# Patient Record
Sex: MALE | Race: OTHER | NOT HISPANIC OR LATINO | Employment: FULL TIME | URBAN - METROPOLITAN AREA
[De-identification: names, ages, dates, MRNs, and addresses within clinical notes are randomized per-mention and may not be internally consistent; named-entity substitution may affect disease eponyms.]

---

## 2024-11-07 ENCOUNTER — OFFICE VISIT (OUTPATIENT)
Dept: URGENT CARE | Facility: CLINIC | Age: 44
End: 2024-11-07
Payer: COMMERCIAL

## 2024-11-07 VITALS
HEART RATE: 80 BPM | HEIGHT: 74 IN | WEIGHT: 180.56 LBS | RESPIRATION RATE: 22 BRPM | TEMPERATURE: 102 F | SYSTOLIC BLOOD PRESSURE: 123 MMHG | BODY MASS INDEX: 23.17 KG/M2 | DIASTOLIC BLOOD PRESSURE: 77 MMHG | OXYGEN SATURATION: 100 %

## 2024-11-07 DIAGNOSIS — R50.9 FEVER, UNSPECIFIED FEVER CAUSE: Primary | ICD-10-CM

## 2024-11-07 DIAGNOSIS — B34.9 ACUTE VIRAL SYNDROME: ICD-10-CM

## 2024-11-07 LAB
CTP QC/QA: YES
CTP QC/QA: YES
POC MOLECULAR INFLUENZA A AGN: NEGATIVE
POC MOLECULAR INFLUENZA B AGN: NEGATIVE
SARS-COV-2 AG RESP QL IA.RAPID: NEGATIVE

## 2024-11-07 PROCEDURE — 71046 X-RAY EXAM CHEST 2 VIEWS: CPT | Mod: S$GLB,,, | Performed by: RADIOLOGY

## 2024-11-07 PROCEDURE — 99214 OFFICE O/P EST MOD 30 MIN: CPT | Mod: S$GLB,,, | Performed by: EMERGENCY MEDICINE

## 2024-11-07 PROCEDURE — 87502 INFLUENZA DNA AMP PROBE: CPT | Mod: QW,S$GLB,, | Performed by: EMERGENCY MEDICINE

## 2024-11-07 PROCEDURE — 87811 SARS-COV-2 COVID19 W/OPTIC: CPT | Mod: QW,S$GLB,, | Performed by: EMERGENCY MEDICINE

## 2024-11-07 RX ORDER — IBUPROFEN 200 MG
600 TABLET ORAL
Status: COMPLETED | OUTPATIENT
Start: 2024-11-07 | End: 2024-11-07

## 2024-11-07 RX ORDER — BROMPHENIRAMINE MALEATE, PSEUDOEPHEDRINE HYDROCHLORIDE, AND DEXTROMETHORPHAN HYDROBROMIDE 2; 30; 10 MG/5ML; MG/5ML; MG/5ML
10 SYRUP ORAL EVERY 6 HOURS PRN
Qty: 200 ML | Refills: 0 | Status: SHIPPED | OUTPATIENT
Start: 2024-11-07 | End: 2024-11-17

## 2024-11-07 RX ADMIN — Medication 600 MG: at 07:11

## 2024-11-08 NOTE — PATIENT INSTRUCTIONS
X-ray shows no pneumonia     COVID swab negative     Flu swab negative     Rest and hydrate with plenty of fluids    Ibuprofen 600 mg every 6 hours as needed for fever or body aches or headaches     Okay to add Tylenol     Bromfed DM prescription for symptomatic relief of runny nose, cough, congestion.      Return for any concerns or problems.

## 2024-11-08 NOTE — PROGRESS NOTES
"Subjective:      Patient ID: Kevin Blanco is a 44 y.o. male.    Vitals:  height is 6' 2" (1.88 m) and weight is 81.9 kg (180 lb 8.9 oz). His oral temperature is 102.2 °F (39 °C) (abnormal). His blood pressure is 123/77 and his pulse is 80. His respiration is 22 (abnormal) and oxygen saturation is 100%.     Chief Complaint: Fever    43 yo c/o fever,dry cough and headache since Monday pm 11/1. REPORTS FATIGUE, NO GI NOR URINARY COMPLAINTS.    Fever   This is a new problem. The problem occurs constantly. The problem has been gradually worsening. He has not experienced a heat injury.Associated symptoms include coughing, headaches and muscle aches. Pertinent negatives include no abdominal pain, chest pain, congestion, nausea, sore throat or vomiting. He has tried NSAIDs for the symptoms. The treatment provided no relief.     ROS  Constitution: Positive for fever. Negative for chills.   HENT:  Negative for congestion, postnasal drip, sinus pain and sore throat.    Neck: Negative for neck pain and neck stiffness.   Cardiovascular:  Negative for chest pain and palpitations.   Respiratory:  Positive for cough. Negative for shortness of breath.    Gastrointestinal:  Negative for abdominal pain, nausea and vomiting.   Genitourinary:  Negative for dysuria and hematuria.   Musculoskeletal:  Negative for joint pain.   Skin:  Negative for wound and laceration.   Neurological:  Positive for headaches. Negative for altered mental status, numbness and tingling.   Psychiatric/Behavioral:  Negative for altered mental status.       Objective:     Physical Exam   Constitutional: He is oriented to person, place, and time. He appears well-developed. He is cooperative.  Non-toxic appearance. He does not appear ill. No distress.   HENT:   Head: Normocephalic and atraumatic.   Ears:   Right Ear: Hearing, tympanic membrane, external ear and ear canal normal.   Left Ear: Hearing, tympanic membrane, external ear and ear canal normal. "   Nose: Congestion present. No mucosal edema, rhinorrhea or nasal deformity. No epistaxis. Right sinus exhibits no maxillary sinus tenderness and no frontal sinus tenderness. Left sinus exhibits no maxillary sinus tenderness and no frontal sinus tenderness.   Mouth/Throat: Uvula is midline, oropharynx is clear and moist and mucous membranes are normal. No trismus in the jaw. Normal dentition. No uvula swelling. No oropharyngeal exudate, posterior oropharyngeal edema or posterior oropharyngeal erythema.   Eyes: Conjunctivae and lids are normal. No scleral icterus.   Neck: Trachea normal and phonation normal. Neck supple. No edema present. No erythema present. No neck rigidity present.   Cardiovascular: Normal rate, regular rhythm, normal heart sounds and normal pulses.   Pulmonary/Chest: Effort normal and breath sounds normal. No respiratory distress. He has no decreased breath sounds. He has no wheezes. He has no rhonchi. He has no rales.   Abdominal: Normal appearance.   Musculoskeletal: Normal range of motion.         General: No deformity. Normal range of motion.   Neurological: He is alert and oriented to person, place, and time. He exhibits normal muscle tone. Coordination normal.   Skin: Skin is warm, dry, intact, not diaphoretic and not pale.   Psychiatric: His speech is normal and behavior is normal. Judgment and thought content normal.   Nursing note and vitals reviewed.        Results for orders placed or performed in visit on 11/07/24   SARS Coronavirus 2 Antigen, POCT Manual Read    Collection Time: 11/07/24  7:44 PM   Result Value Ref Range    SARS Coronavirus 2 Antigen Negative Negative     Acceptable Yes    POCT Influenza A/B MOLECULAR    Collection Time: 11/07/24  7:44 PM   Result Value Ref Range    POC Molecular Influenza A Ag Negative Negative    POC Molecular Influenza B Ag Negative Negative     Acceptable Yes      XR CHEST PA AND LATERAL    Result Date:  11/7/2024  EXAMINATION: CHEST PA AND LATERAL CLINICAL HISTORY: Fever, unspecified TECHNIQUE: PA and lateral chest radiograph COMPARISON: <None.> FINDINGS: The cardiac silhouette is within normal limits.  There is subtle asymmetrical density at the right hemidiaphragm, which may suggest atelectasis or early infiltrate.  There is no pneumothorax or significant pleural effusion.     As above described. Electronically signed by: Emily Melvin Date:    11/07/2024 Time:    20:35     Assessment:     1. Fever, unspecified fever cause    2. Acute viral syndrome        Plan:       Fever, unspecified fever cause  -     SARS Coronavirus 2 Antigen, POCT Manual Read  -     POCT Influenza A/B MOLECULAR  -     XR CHEST PA AND LATERAL; Future; Expected date: 11/07/2024    Acute viral syndrome    Other orders  -     ibuprofen tablet 600 mg  -     brompheniramine-pseudoeph-DM (BROMFED DM) 2-30-10 mg/5 mL Syrp; Take 10 mLs by mouth every 6 (six) hours as needed (cough, congestion).  Dispense: 200 mL; Refill: 0      Patient Instructions   X-ray shows no pneumonia     COVID swab negative     Flu swab negative     Rest and hydrate with plenty of fluids    Ibuprofen 600 mg every 6 hours as needed for fever or body aches or headaches     Okay to add Tylenol     Bromfed DM prescription for symptomatic relief of runny nose, cough, congestion.      Return for any concerns or problems.

## 2024-11-10 ENCOUNTER — ON-DEMAND VIRTUAL (OUTPATIENT)
Dept: URGENT CARE | Facility: CLINIC | Age: 44
End: 2024-11-10
Payer: COMMERCIAL

## 2024-11-10 DIAGNOSIS — J06.9 URI WITH COUGH AND CONGESTION: Primary | ICD-10-CM

## 2024-11-10 PROCEDURE — 99213 OFFICE O/P EST LOW 20 MIN: CPT | Mod: 95,,, | Performed by: NURSE PRACTITIONER

## 2024-11-10 RX ORDER — PREDNISONE 20 MG/1
20 TABLET ORAL DAILY
Qty: 5 TABLET | Refills: 0 | Status: SHIPPED | OUTPATIENT
Start: 2024-11-10 | End: 2024-11-15

## 2024-11-10 RX ORDER — AZITHROMYCIN 250 MG/1
250 TABLET, FILM COATED ORAL DAILY
Qty: 6 TABLET | Refills: 0 | Status: SHIPPED | OUTPATIENT
Start: 2024-11-10

## 2024-11-10 NOTE — PROGRESS NOTES
Subjective:      Patient ID: Kevin Blanco is a 44 y.o. male.    Vitals:  vitals were not taken for this visit.     Chief Complaint: Cough      Visit Type: TELE AUDIOVISUAL - This visit was conducted virtually based on  subjective information and limited objective exam    Present with the patient at the time of consultation: TELEMED PRESENT WITH PATIENT: None  Two patient identifiers used to verify patient- saying out date of birth and full name.       History reviewed. No pertinent past medical history.  History reviewed. No pertinent surgical history.  Review of patient's allergies indicates:  No Known Allergies  Current Outpatient Medications on File Prior to Visit   Medication Sig Dispense Refill    brompheniramine-pseudoeph-DM (BROMFED DM) 2-30-10 mg/5 mL Syrp Take 10 mLs by mouth every 6 (six) hours as needed (cough, congestion). 200 mL 0     No current facility-administered medications on file prior to visit.     No family history on file.    Medications Ordered                [id number] - [facility] [address]   [address] [address]    Telephone: [phone]   Fax: [phone]   Hours: Not open 24 hours                         E-Prescribed (2 of 2)              azithromycin (Z-JAS) 250 MG tablet    Sig: Take 1 tablet (250 mg total) by mouth once daily. Take two tabs p.o. now, then 1 tab daily until complete.       Start: 11/10/24     Quantity: 6 tablet Refills: 0                         predniSONE (DELTASONE) 20 MG tablet    Sig: Take 1 tablet (20 mg total) by mouth once daily. for 5 days       Start: 11/10/24     Quantity: 5 tablet Refills: 0                           Ohs Peq Odvv Intake    11/10/2024  2:46 PM CST - Filed by Patient   What is your current physical address in the event of a medical emergency? [address] [place] [address]   Are you able to take your vital signs? Yes   Systolic Blood Pressure: 113   Diastolic  Blood Pressure: 68   Weight: 175   Height: 74   Pulse: 89   Temperature: 100.2   Respiration rate:    Pulse Oxygen:    Please attach any relevant images or files    Is your employer contracted with [facility]? No         43 yo male with c/o with c/o congestion, fever, cough with some wheezing. He has asthma with allergies. He states he went to urgent care and had tests and xray . He states still running fever. And having cough. He denies sob. Tmax today 102.7        Constitution: Positive for fever.   HENT:  Positive for congestion.    Cardiovascular: Negative.    Eyes: Negative.    Respiratory:  Positive for cough.    Gastrointestinal: Negative.  Negative for bowel incontinence.   Endocrine: negative.   Genitourinary: Negative.  Negative for dysuria, flank pain, bladder incontinence and pelvic pain.   Musculoskeletal: Negative.  Negative for pain, abnormal ROM of joint and back pain.   Skin: Negative.    Allergic/Immunologic: Negative.    Neurological: Negative.    Hematologic/Lymphatic: Negative.    Psychiatric/Behavioral: Negative.          Objective:   The physical exam was conducted virtually.  LOCATION OF PATIENT [facility] x 3 ; no acute distress noted; appearance normal; mood and behavior normal; thought process normal  Head- normocephalic  Nose- appears normal, no discharge or erythema  Eyes- pupils appear normal in size, no drainage, no erythema  Ears- normal appearing; no discharge, no erythema  Mouth- appears normal  Oropharynx- no erythema, lesions  Lungs- breathing at a normal rate, no acute distress noted  Heart- no reports of tachycardia, palpitations, chest pain  Abdomen- non distended, non tender reported by patient  Skin- warm and dry, no erythema or edema noted by patient or visualized  Psych- as above; no si/hi      Assessment:     1. URI with cough and congestion        Plan:     Patient Instructions      Please follow up with your Primary care provider within 2-5 days if  "your signs and symptoms have not resolved or worsen.  The usual course of cold symptoms are 10-14 days.      If your condition worsens or fails to improve we recommend that you receive another evaluation at the emergency room immediately or contact your primary medical clinic to discuss your concerns.      You must understand that you have received an Telemedicine treatment only and that you may be released before all of your medical problems are known or treated.   You, the patient, will arrange for follow up care as instructed.      Tylenol or Ibuprofen can also be used as directed for pain/fever unless you have an allergy to them or medical condition such as stomach ulcers, kidney or liver disease or blood thinners etc for which you should not be taking these type of medications.      Take over the counter cough medication as directed as needed for cough.  You should avoid medications with pseudoephedrine or phenylephrine (any medication with "D") if you have high blood pressure as this can cause an elevation in your blood pressure. Instead consider Corcidin HBP as needed to prevent an elevated blood pressure.      Natural remedies of symptoms (as needed) include humidification, saline nasal sprays, and/or steamy showers.  Increase fluids, warm tea with honey, cough drops as needed.  You may also use salt water gargles for sore throat.     IF you received a steroid shot today - As discussed, this can elevate your blood pressure, elevate your blood sugar, water weight gain, nervous energy, redness to the face and dimpling of the skin at the injection site.          Thank you for choosing [facility]!    Our goal in the [facility] is to always provide outstanding medical care. You may receive a survey by mail or e-mail in the next week regarding your experience today. We would greatly appreciate you completing and returning the survey. Your feedback provides us with a way to " recognize our staff who provide very good care, and it helps us learn how to improve when your experience was below our aspiration of excellence.         We appreciate you trusting us with your medical care. We hope you feel better soon. We will be happy to take care of you for all of your future medical needs.    You must understand that you've received an Urgent Care treatment only and that you may be released before all your medical problems are known or treated. You, the patient, will arrange for follow up care as instructed.    Follow up with your PCP or specialty clinic as directed in the next 1-2 weeks if not improved or as needed.  You can call [phone] to schedule an appointment with the appropriate provider.    If your condition worsens we recommend that you receive another evaluation in person, with your primary care provider, urgent care or at the emergency room immediately or contact your primary medical clinics after hours call service to discuss your concerns.         URI with cough and congestion  -     azithromycin (Z-JAS) 250 MG tablet; Take 1 tablet (250 mg total) by mouth once daily. Take two tabs p.o. now, then 1 tab daily until complete.  Dispense: 6 tablet; Refill: 0  -     predniSONE (DELTASONE) 20 MG tablet; Take 1 tablet (20 mg total) by mouth once daily. for 5 days  Dispense: 5 tablet; Refill: 0

## 2024-11-26 ENCOUNTER — OFFICE VISIT (OUTPATIENT)
Dept: URGENT CARE | Facility: CLINIC | Age: 44
End: 2024-11-26
Payer: COMMERCIAL

## 2024-11-26 VITALS
TEMPERATURE: 98 F | DIASTOLIC BLOOD PRESSURE: 73 MMHG | OXYGEN SATURATION: 96 % | BODY MASS INDEX: 23.1 KG/M2 | HEIGHT: 74 IN | WEIGHT: 180 LBS | SYSTOLIC BLOOD PRESSURE: 119 MMHG | RESPIRATION RATE: 16 BRPM | HEART RATE: 74 BPM

## 2024-11-26 DIAGNOSIS — J01.90 ACUTE VIRAL SINUSITIS: ICD-10-CM

## 2024-11-26 DIAGNOSIS — R09.82 POSTNASAL DRIP: ICD-10-CM

## 2024-11-26 DIAGNOSIS — R05.1 ACUTE COUGH: ICD-10-CM

## 2024-11-26 DIAGNOSIS — B97.89 ACUTE VIRAL SINUSITIS: ICD-10-CM

## 2024-11-26 DIAGNOSIS — J20.8 ACUTE VIRAL BRONCHITIS: Primary | ICD-10-CM

## 2024-11-26 PROCEDURE — 94640 AIRWAY INHALATION TREATMENT: CPT | Mod: S$GLB,,,

## 2024-11-26 PROCEDURE — 99213 OFFICE O/P EST LOW 20 MIN: CPT | Mod: 25,S$GLB,,

## 2024-11-26 RX ORDER — PROMETHAZINE HYDROCHLORIDE AND DEXTROMETHORPHAN HYDROBROMIDE 6.25; 15 MG/5ML; MG/5ML
5 SYRUP ORAL NIGHTLY PRN
Qty: 118 ML | Refills: 0 | Status: SHIPPED | OUTPATIENT
Start: 2024-11-26

## 2024-11-26 RX ORDER — ALBUTEROL SULFATE 90 UG/1
INHALANT RESPIRATORY (INHALATION)
COMMUNITY

## 2024-11-26 RX ORDER — LEVOCETIRIZINE DIHYDROCHLORIDE 5 MG/1
5 TABLET, FILM COATED ORAL NIGHTLY
Qty: 30 TABLET | Refills: 0 | Status: SHIPPED | OUTPATIENT
Start: 2024-11-26

## 2024-11-26 RX ORDER — IPRATROPIUM BROMIDE 0.5 MG/2.5ML
0.5 SOLUTION RESPIRATORY (INHALATION)
Status: COMPLETED | OUTPATIENT
Start: 2024-11-26 | End: 2024-11-26

## 2024-11-26 RX ORDER — ALBUTEROL SULFATE 0.83 MG/ML
2.5 SOLUTION RESPIRATORY (INHALATION)
Status: COMPLETED | OUTPATIENT
Start: 2024-11-26 | End: 2024-11-26

## 2024-11-26 RX ORDER — IPRATROPIUM BROMIDE 21 UG/1
2 SPRAY, METERED NASAL 2 TIMES DAILY
Qty: 30 ML | Refills: 0 | Status: SHIPPED | OUTPATIENT
Start: 2024-11-26

## 2024-11-26 RX ORDER — ALBUTEROL SULFATE 90 UG/1
2 INHALANT RESPIRATORY (INHALATION) EVERY 6 HOURS PRN
Qty: 18 G | Refills: 0 | Status: SHIPPED | OUTPATIENT
Start: 2024-11-26

## 2024-11-26 RX ADMIN — IPRATROPIUM BROMIDE 0.5 MG: 0.5 SOLUTION RESPIRATORY (INHALATION) at 11:11

## 2024-11-26 RX ADMIN — ALBUTEROL SULFATE 2.5 MG: 0.83 SOLUTION RESPIRATORY (INHALATION) at 11:11

## 2024-11-26 NOTE — PROGRESS NOTES
"Subjective:      Patient ID: Kevin Blanco is a 44 y.o. male.    Vitals:  height is 6' 2" (1.88 m) and weight is 81.6 kg (180 lb). His oral temperature is 98.3 °F (36.8 °C). His blood pressure is 119/73 and his pulse is 74. His respiration is 16 and oxygen saturation is 96%.     Chief Complaint: Cough    43 y/o male with ongoing productive cough x 3 weeks. Was seen here on 11/7, negative XR. Was also seen on 11/10 and was prescribed azithromycin and and prednisone. Patient states fever has resolved since visit on 11/7. He states he wasn't able to  Bromfed which was prescribed on 11/7. He has been using albuterol inhaler as needed for coughing spells. He also has a lot of runny nose and congestion. He denies fever, CP, SOB, GI complaints, or any other associated symptoms. Coughing spells has been frequent.     Cough  This is a new problem. The current episode started 1 to 4 weeks ago. The problem has been unchanged. The problem occurs hourly. The cough is Productive of sputum. Associated symptoms include nasal congestion, postnasal drip and rhinorrhea. Pertinent negatives include no chest pain, chills, ear congestion, ear pain, fever, headaches, heartburn, hemoptysis, myalgias, rash, sore throat, shortness of breath, sweats, weight loss or wheezing. Nothing aggravates the symptoms. He has tried a beta-agonist inhaler for the symptoms. The treatment provided no relief. His past medical history is significant for asthma. There is no history of bronchiectasis, bronchitis, COPD, emphysema, environmental allergies or pneumonia.       Constitution: Negative for activity change, appetite change, chills, sweating, fatigue and fever.   HENT:  Positive for congestion and postnasal drip. Negative for ear pain, ear discharge, foreign body in ear, tinnitus, hearing loss, dental problem, facial trauma, nosebleeds, foreign body in nose, sinus pain, sinus pressure and sore throat.    Neck: Negative for neck pain, neck " stiffness and painful lymph nodes.   Cardiovascular:  Negative for chest trauma, chest pain, leg swelling and palpitations.   Eyes:  Negative for eye trauma, foreign body in eye, eye discharge, eye itching and eye pain.   Respiratory:  Positive for cough and sputum production. Negative for sleep apnea, chest tightness, bloody sputum, COPD, shortness of breath, stridor, wheezing and asthma.    Gastrointestinal:  Negative for abdominal trauma, abdominal pain, abdominal bloating, history of abdominal surgery, hemorrhoids, heartburn and bowel incontinence.   Endocrine: hair loss, cold intolerance, heat intolerance and excessive thirst.   Genitourinary:  Negative for dysuria, frequency, urgency, urine decreased and flank pain.   Musculoskeletal:  Negative for pain, trauma, joint pain, joint swelling, abnormal ROM of joint and muscle ache.   Skin:  Negative for color change, pale, rash, wound, abrasion and laceration.   Allergic/Immunologic: Negative for environmental allergies, seasonal allergies, food allergies, eczema and asthma.   Neurological:  Negative for dizziness, history of vertigo, light-headedness, passing out, facial drooping, headaches, disorientation and altered mental status.   Hematologic/Lymphatic: Negative for swollen lymph nodes, easy bruising/bleeding and trouble clotting. Does not bruise/bleed easily.   Psychiatric/Behavioral:  Negative for altered mental status, disorientation, confusion, agitation and nervous/anxious. The patient is not nervous/anxious.       Objective:     Physical Exam   Constitutional: He is oriented to person, place, and time. He appears well-developed. He is cooperative.  Non-toxic appearance. He does not appear ill. No distress.   HENT:   Head: Normocephalic and atraumatic.   Ears:   Right Ear: Hearing, tympanic membrane, external ear and ear canal normal.   Left Ear: Hearing, tympanic membrane, external ear and ear canal normal.   Nose: Mucosal edema and rhinorrhea present.  No nasal deformity. No epistaxis. Right sinus exhibits no maxillary sinus tenderness and no frontal sinus tenderness. Left sinus exhibits no maxillary sinus tenderness and no frontal sinus tenderness.   Mouth/Throat: Uvula is midline, oropharynx is clear and moist and mucous membranes are normal. Mucous membranes are moist. No trismus in the jaw. Normal dentition. No uvula swelling. No oropharyngeal exudate, posterior oropharyngeal edema or posterior oropharyngeal erythema. Oropharynx is clear.   Eyes: Conjunctivae and lids are normal. No scleral icterus.   Neck: Trachea normal and phonation normal. Neck supple. No edema present. No erythema present. No neck rigidity present.   Cardiovascular: Normal rate, regular rhythm, normal heart sounds and normal pulses.   Pulmonary/Chest: Effort normal and breath sounds normal. No stridor. No respiratory distress. He has no decreased breath sounds. He has no wheezes. He has no rhonchi. He has no rales. He exhibits no tenderness.   Abdominal: Normal appearance and bowel sounds are normal. He exhibits no distension and no mass. Soft. There is no abdominal tenderness. There is no rebound, no guarding, no left CVA tenderness and no right CVA tenderness. No hernia.   Musculoskeletal: Normal range of motion.         General: No deformity. Normal range of motion.   Neurological: He is alert and oriented to person, place, and time. He exhibits normal muscle tone. Coordination normal.   Skin: Skin is warm, dry, intact, not diaphoretic and not pale.   Psychiatric: His speech is normal and behavior is normal. Judgment and thought content normal.   Nursing note and vitals reviewed.      Assessment:     1. Acute viral bronchitis    2. Acute cough    3. Postnasal drip    4. Acute viral sinusitis        Plan:       Acute viral bronchitis    Acute cough  -     albuterol nebulizer solution 2.5 mg  -     ipratropium 0.02 % nebulizer solution 0.5 mg  -     promethazine-dextromethorphan  (PROMETHAZINE-DM) 6.25-15 mg/5 mL Syrp; Take 5 mLs by mouth nightly as needed (cough).  Dispense: 118 mL; Refill: 0  -     albuterol (VENTOLIN HFA) 90 mcg/actuation inhaler; Inhale 2 puffs into the lungs every 6 (six) hours as needed for Wheezing. Rescue  Dispense: 18 g; Refill: 0    Postnasal drip  -     ipratropium (ATROVENT) 21 mcg (0.03 %) nasal spray; 2 sprays by Each Nostril route 2 (two) times daily.  Dispense: 30 mL; Refill: 0    Acute viral sinusitis  -     ipratropium (ATROVENT) 21 mcg (0.03 %) nasal spray; 2 sprays by Each Nostril route 2 (two) times daily.  Dispense: 30 mL; Refill: 0  -     levocetirizine (XYZAL) 5 MG tablet; Take 1 tablet (5 mg total) by mouth every evening.  Dispense: 30 tablet; Refill: 0          Medical Decision Making:   Urgent Care Management:  Patient reports significant improvement of cough after DuoNeb tx. Patient in no acute distress.     Additional MDM:     Heart Failure Score:   COPD = No

## 2024-11-26 NOTE — PATIENT INSTRUCTIONS
Take Promethazine as needed for cough. Please be aware promethazine can cause drowsiness. Continue albuterol inhaler as needed for cough or wheezing or chest tightness. Use nasal spray daily and take xyzal daily for sinus.     When do I need to call the doctor?   You have a fever of 100.4°F (38°C) or higher or chills.  You have chest pain when you cough, have trouble breathing but can still talk in full sentences, or cough up blood.  You develop a barking cough.  You are still coughing in 3 weeks.  You have new or worsening symptoms.  - Rest.    - Drink plenty of fluids.    - Acetaminophen (tylenol) or Ibuprofen (advil,motrin) as directed as needed for fever/pain. Avoid tylenol if you have a history of liver disease. Do not take ibuprofen if you have a history of GI bleeding, kidney disease, or if you take blood thinners.     - Follow up with your PCP or specialty clinic as directed in the next 1-2 weeks if not improved or as needed.  You can call [phone] to schedule an appointment with the appropriate provider.    - Go to the ER or seek medical attention immediately if you develop new or worsening symptoms.     - You must understand that you have received an Urgent Care treatment only and that you may be released before all of your medical problems are known or treated.   - You, the patient, will arrange for follow up care as instructed.   - If your condition worsens or fails to improve we recommend that you receive another evaluation at the ER immediately or contact your PCP to discuss your concerns or return here.

## 2024-12-19 ENCOUNTER — LAB VISIT (OUTPATIENT)
Dept: LAB | Facility: HOSPITAL | Age: 44
End: 2024-12-19
Attending: STUDENT IN AN ORGANIZED HEALTH CARE EDUCATION/TRAINING PROGRAM
Payer: COMMERCIAL

## 2024-12-19 ENCOUNTER — OFFICE VISIT (OUTPATIENT)
Dept: PRIMARY CARE CLINIC | Facility: CLINIC | Age: 44
End: 2024-12-19
Payer: COMMERCIAL

## 2024-12-19 VITALS
WEIGHT: 172.63 LBS | SYSTOLIC BLOOD PRESSURE: 94 MMHG | TEMPERATURE: 98 F | BODY MASS INDEX: 22.15 KG/M2 | DIASTOLIC BLOOD PRESSURE: 62 MMHG | HEIGHT: 74 IN | HEART RATE: 64 BPM | OXYGEN SATURATION: 97 %

## 2024-12-19 DIAGNOSIS — J45.909 ASTHMA, UNSPECIFIED ASTHMA SEVERITY, UNSPECIFIED WHETHER COMPLICATED, UNSPECIFIED WHETHER PERSISTENT: Chronic | ICD-10-CM

## 2024-12-19 DIAGNOSIS — M54.50 CHRONIC MIDLINE LOW BACK PAIN WITHOUT SCIATICA: ICD-10-CM

## 2024-12-19 DIAGNOSIS — G89.29 CHRONIC MIDLINE LOW BACK PAIN WITHOUT SCIATICA: ICD-10-CM

## 2024-12-19 DIAGNOSIS — J30.1 SEASONAL ALLERGIC RHINITIS DUE TO POLLEN: ICD-10-CM

## 2024-12-19 DIAGNOSIS — Z00.00 ANNUAL PHYSICAL EXAM: Primary | ICD-10-CM

## 2024-12-19 DIAGNOSIS — Z00.00 ANNUAL PHYSICAL EXAM: ICD-10-CM

## 2024-12-19 DIAGNOSIS — Z80.42 FAMILY HISTORY OF PROSTATE CANCER: ICD-10-CM

## 2024-12-19 PROBLEM — B00.2 HERPETIC GINGIVOSTOMATITIS: Status: ACTIVE | Noted: 2024-12-19

## 2024-12-19 PROBLEM — Z83.719 FAMILY HISTORY OF COLONIC POLYPS: Status: ACTIVE | Noted: 2021-10-06

## 2024-12-19 PROBLEM — B00.9 HSV-1 INFECTION: Status: ACTIVE | Noted: 2024-12-19

## 2024-12-19 LAB
ALBUMIN SERPL BCP-MCNC: 4.2 G/DL (ref 3.5–5.2)
ALP SERPL-CCNC: 45 U/L (ref 40–150)
ALT SERPL W/O P-5'-P-CCNC: 15 U/L (ref 10–44)
ANION GAP SERPL CALC-SCNC: 9 MMOL/L (ref 8–16)
AST SERPL-CCNC: 15 U/L (ref 10–40)
BASOPHILS # BLD AUTO: 0.05 K/UL (ref 0–0.2)
BASOPHILS NFR BLD: 0.9 % (ref 0–1.9)
BILIRUB SERPL-MCNC: 1.5 MG/DL (ref 0.1–1)
BUN SERPL-MCNC: 9 MG/DL (ref 6–20)
CALCIUM SERPL-MCNC: 9.5 MG/DL (ref 8.7–10.5)
CHLORIDE SERPL-SCNC: 107 MMOL/L (ref 95–110)
CHOLEST SERPL-MCNC: 189 MG/DL (ref 120–199)
CHOLEST/HDLC SERPL: 3 {RATIO} (ref 2–5)
CO2 SERPL-SCNC: 24 MMOL/L (ref 23–29)
COMPLEXED PSA SERPL-MCNC: 0.55 NG/ML (ref 0–4)
CREAT SERPL-MCNC: 0.8 MG/DL (ref 0.5–1.4)
DIFFERENTIAL METHOD BLD: ABNORMAL
EOSINOPHIL # BLD AUTO: 0.5 K/UL (ref 0–0.5)
EOSINOPHIL NFR BLD: 8.7 % (ref 0–8)
ERYTHROCYTE [DISTWIDTH] IN BLOOD BY AUTOMATED COUNT: 13.5 % (ref 11.5–14.5)
EST. GFR  (NO RACE VARIABLE): >60 ML/MIN/1.73 M^2
ESTIMATED AVG GLUCOSE: 88 MG/DL (ref 68–131)
GLUCOSE SERPL-MCNC: 88 MG/DL (ref 70–110)
HBA1C MFR BLD: 4.7 % (ref 4–5.6)
HCT VFR BLD AUTO: 46 % (ref 40–54)
HDLC SERPL-MCNC: 63 MG/DL (ref 40–75)
HDLC SERPL: 33.3 % (ref 20–50)
HGB BLD-MCNC: 15.9 G/DL (ref 14–18)
IMM GRANULOCYTES # BLD AUTO: 0.01 K/UL (ref 0–0.04)
IMM GRANULOCYTES NFR BLD AUTO: 0.2 % (ref 0–0.5)
LDLC SERPL CALC-MCNC: 111 MG/DL (ref 63–159)
LYMPHOCYTES # BLD AUTO: 1.4 K/UL (ref 1–4.8)
LYMPHOCYTES NFR BLD: 26.4 % (ref 18–48)
MCH RBC QN AUTO: 33.5 PG (ref 27–31)
MCHC RBC AUTO-ENTMCNC: 34.6 G/DL (ref 32–36)
MCV RBC AUTO: 97 FL (ref 82–98)
MONOCYTES # BLD AUTO: 0.5 K/UL (ref 0.3–1)
MONOCYTES NFR BLD: 9.3 % (ref 4–15)
NEUTROPHILS # BLD AUTO: 2.9 K/UL (ref 1.8–7.7)
NEUTROPHILS NFR BLD: 54.5 % (ref 38–73)
NONHDLC SERPL-MCNC: 126 MG/DL
NRBC BLD-RTO: 0 /100 WBC
PLATELET # BLD AUTO: 298 K/UL (ref 150–450)
PMV BLD AUTO: 10.1 FL (ref 9.2–12.9)
POTASSIUM SERPL-SCNC: 4.5 MMOL/L (ref 3.5–5.1)
PROT SERPL-MCNC: 6.6 G/DL (ref 6–8.4)
RBC # BLD AUTO: 4.74 M/UL (ref 4.6–6.2)
SODIUM SERPL-SCNC: 140 MMOL/L (ref 136–145)
TRIGL SERPL-MCNC: 75 MG/DL (ref 30–150)
TSH SERPL DL<=0.005 MIU/L-ACNC: 0.67 UIU/ML (ref 0.4–4)
WBC # BLD AUTO: 5.38 K/UL (ref 3.9–12.7)

## 2024-12-19 PROCEDURE — 84153 ASSAY OF PSA TOTAL: CPT | Performed by: STUDENT IN AN ORGANIZED HEALTH CARE EDUCATION/TRAINING PROGRAM

## 2024-12-19 PROCEDURE — 83036 HEMOGLOBIN GLYCOSYLATED A1C: CPT | Performed by: STUDENT IN AN ORGANIZED HEALTH CARE EDUCATION/TRAINING PROGRAM

## 2024-12-19 PROCEDURE — 80053 COMPREHEN METABOLIC PANEL: CPT | Performed by: STUDENT IN AN ORGANIZED HEALTH CARE EDUCATION/TRAINING PROGRAM

## 2024-12-19 PROCEDURE — 85025 COMPLETE CBC W/AUTO DIFF WBC: CPT | Performed by: STUDENT IN AN ORGANIZED HEALTH CARE EDUCATION/TRAINING PROGRAM

## 2024-12-19 PROCEDURE — 99999 PR PBB SHADOW E&M-EST. PATIENT-LVL IV: CPT | Mod: PBBFAC,,, | Performed by: STUDENT IN AN ORGANIZED HEALTH CARE EDUCATION/TRAINING PROGRAM

## 2024-12-19 PROCEDURE — 84443 ASSAY THYROID STIM HORMONE: CPT | Performed by: STUDENT IN AN ORGANIZED HEALTH CARE EDUCATION/TRAINING PROGRAM

## 2024-12-19 PROCEDURE — 80061 LIPID PANEL: CPT | Performed by: STUDENT IN AN ORGANIZED HEALTH CARE EDUCATION/TRAINING PROGRAM

## 2024-12-19 RX ORDER — FLUTICASONE PROPIONATE AND SALMETEROL 100; 50 UG/1; UG/1
1 POWDER RESPIRATORY (INHALATION) 2 TIMES DAILY
Qty: 60 EACH | Refills: 5 | Status: SHIPPED | OUTPATIENT
Start: 2024-12-19

## 2024-12-19 RX ORDER — CETIRIZINE HYDROCHLORIDE 10 MG/1
10 TABLET ORAL DAILY
COMMUNITY

## 2024-12-19 NOTE — PROGRESS NOTES
Office visit  Patient: Kevin Blanco   12/19/2024       Assessment/Plan:       1. Annual physical exam  -     TSH; Future; Expected date: 12/19/2024  -     Hemoglobin A1C; Future; Expected date: 12/19/2024  -     Lipid Panel; Future; Expected date: 12/19/2024  -     Comprehensive Metabolic Panel; Future; Expected date: 12/19/2024  -     CBC Auto Differential; Future; Expected date: 12/19/2024        -Discussed healthy habits and recommended preventative screening    2. Asthma, unspecified asthma severity, unspecified whether complicated, unspecified whether persistent  -     fluticasone-salmeterol diskus inhaler 100-50 mcg; Inhale 1 puff into the lungs 2 (two) times daily. Controller  Dispense: 60 each; Refill: 5        -patient reports using albuterol more frequently in setting of URI 2 months ago; will start ICS-LABA    3. Seasonal allergic rhinitis due to pollen       -stable, continue OTC medications as needed    4. Family history of prostate cancer  -     PSA, SCREENING; Future; Expected date: 12/19/2024    5. Chronic midline low back pain without sciatica  -     Ambulatory referral/consult to [facility]; Future; Expected date: 12/26/2024      Return to clinic in 1 year or sooner as needed.        CHIEF COMPLAINT: annual physical    HPI: Kevin Blanco is a 44 y.o. male who presents for an annual physical.     He reports having a URI/asthma exacerbation in October, for which he took steroids and a Z-pack.  He's had a lingering cough.    Review of Systems   Constitutional:  Negative for fever and malaise/fatigue.   HENT:  Negative for congestion, ear discharge, ear pain and sore throat.    Eyes:  Negative for pain and discharge.   Respiratory:  Positive for cough. Negative for shortness of breath.    Cardiovascular:  Positive for chest pain (with coughing). Negative for palpitations.   Gastrointestinal:  Negative for abdominal pain, constipation, diarrhea, nausea and vomiting.   Genitourinary:   "Negative for dysuria, frequency and hematuria.   Musculoskeletal:  Negative for joint pain and myalgias.   Skin:  Negative for rash.   Neurological:  Negative for dizziness, seizures and headaches.         Current Outpatient Medications   Medication Instructions    albuterol (VENTOLIN HFA) 90 mcg/actuation inhaler 2 puffs, Inhalation, Every 6 hours PRN, Rescue    cetirizine (ZYRTEC) 10 mg, Daily    fluticasone-salmeterol diskus inhaler 100-50 mcg 1 puff, Inhalation, 2 times daily, Controller       Lab Results   Component Value Date    HGBA1C 4.9 12/20/2023     No results found for: "MICALBCREAT"  No results found for: "LDLCALC", "CHOL", "HDL", "TRIG"    Lab Results   Component Value Date     12/20/2023    K 4.3 12/20/2023    CO2 28 12/20/2023    GLU 88 12/20/2023    BUN 12 12/20/2023    CREATININE 0.80 12/20/2023    CALCIUM 9.8 12/20/2023    ALBUMIN 4.8 12/20/2023    BILITOT 1.5 (H) 12/20/2023    ALKPHOS 39 12/20/2023    AST 13 12/20/2023    ALT 13 12/20/2023    ESTGFRAFRICA >105 12/20/2022       No results found for: "LH", "FSH", "TOTALTESTOST", "PROGESTERONE", "ESTRADIOL", "[id number]", "[id number]", "FERRITIN", "IRON", "TRANSFERRIN", "TIBC", "FESATURATED", "ZINC"      History reviewed. No pertinent past medical history.  Past Surgical History:   Procedure Laterality Date    ADENOIDECTOMY      Around 1992    TONSILLECTOMY      Around 1992       Social History     Tobacco Use    Smoking status: Never     Passive exposure: Never    Smokeless tobacco: Never   Substance Use Topics    Alcohol use: Yes     Alcohol/week: 6.0 standard drinks of alcohol     Types: 6 Cans of beer per week    Drug use: Never       family history includes Brain cancer in his father; Breast cancer in his maternal aunt and paternal grandmother; Esophageal cancer in his father; Heart attack (age of onset: 60 - 69) in his father; Heart disease in his father; Prostate cancer in his father.    Vitals:    12/19/24 0915   BP: 94/62   Pulse: " "64   Temp: 97.9 °F (36.6 °C)   TempSrc: Oral   SpO2: 97%   Weight: 78.3 kg (172 lb 9.9 oz)   Height: 6' 2" (1.88 m)   PainSc: 0-No pain     Objective:   Physical Exam  Vitals reviewed.   Constitutional:       General: He is not in acute distress.     Appearance: Normal appearance. He is well-developed.   HENT:      Head: Normocephalic and atraumatic.      Right Ear: Hearing and external ear normal. No decreased hearing noted. No drainage or swelling.      Left Ear: Hearing and external ear normal. No decreased hearing noted. No drainage or swelling.      Nose: Nose normal. No rhinorrhea.      Mouth/Throat:      Mouth: Mucous membranes are moist.   Eyes:      General: Lids are normal.         Right eye: No discharge.         Left eye: No discharge.      Conjunctiva/sclera: Conjunctivae normal.      Right eye: Right conjunctiva is not injected. No exudate.     Left eye: Left conjunctiva is not injected. No exudate.  Cardiovascular:      Rate and Rhythm: Normal rate and regular rhythm.      Heart sounds: Normal heart sounds. No murmur heard.     No friction rub. No gallop.   Pulmonary:      Effort: Pulmonary effort is normal. No respiratory distress.      Breath sounds: No stridor. No wheezing, rhonchi or rales.   Musculoskeletal:         General: No deformity.      Right lower leg: No edema.      Left lower leg: No edema.   Skin:     General: Skin is warm and dry.   Neurological:      General: No focal deficit present.      Mental Status: He is alert and oriented to person, place, and time.   Psychiatric:         Mood and Affect: Mood normal.         Speech: Speech normal.         Behavior: Behavior normal.             Sanjuanita Fish MD  Internal Medicine and Pediatrics                            "

## 2025-03-17 DIAGNOSIS — R05.1 ACUTE COUGH: ICD-10-CM

## 2025-03-17 RX ORDER — ALBUTEROL SULFATE 90 UG/1
2 INHALANT RESPIRATORY (INHALATION) EVERY 6 HOURS PRN
Qty: 18 G | Refills: 0 | Status: SHIPPED | OUTPATIENT
Start: 2025-03-17

## 2025-03-17 NOTE — TELEPHONE ENCOUNTER
----- Message from Edwige sent at 3/17/2025  3:59 PM CDT -----  Requesting an RX refill or new RX.Is this a refill or new RX: RX name and strength (copy/paste from chart):  albuterol (VENTOLIN HFA) 90 mcg/actuation inhalerIs this a 30 day or 90 day RX: Pharmacy name and phone # (copy/paste from chart):  [facility] [id number] [facility] [place] [address] [facility] [id number]: [phone] Fax: [phone] doctors have asked that we provide their patients with the following 2 reminders -- prescription refills can take up to 72 hours, and a friendly reminder that in the future you can use your [facility] account to request refills:

## 2025-03-17 NOTE — TELEPHONE ENCOUNTER
No care due was identified.  [facility] Embedded Care Due Messages. Reference number: [id number].   3/17/2025 4:12:48 PM CDT

## 2025-04-09 DIAGNOSIS — R05.1 ACUTE COUGH: ICD-10-CM

## 2025-04-09 RX ORDER — ALBUTEROL SULFATE 90 UG/1
2 INHALANT RESPIRATORY (INHALATION) EVERY 6 HOURS PRN
Qty: 18 G | Refills: 2 | Status: SHIPPED | OUTPATIENT
Start: 2025-04-09